# Patient Record
Sex: FEMALE | Race: BLACK OR AFRICAN AMERICAN | NOT HISPANIC OR LATINO | Employment: FULL TIME | URBAN - METROPOLITAN AREA
[De-identification: names, ages, dates, MRNs, and addresses within clinical notes are randomized per-mention and may not be internally consistent; named-entity substitution may affect disease eponyms.]

---

## 2021-03-19 ENCOUNTER — OFFICE VISIT (OUTPATIENT)
Dept: FAMILY MEDICINE CLINIC | Facility: CLINIC | Age: 46
End: 2021-03-19

## 2021-03-19 VITALS
BODY MASS INDEX: 26.06 KG/M2 | HEIGHT: 65 IN | WEIGHT: 156.4 LBS | SYSTOLIC BLOOD PRESSURE: 130 MMHG | DIASTOLIC BLOOD PRESSURE: 72 MMHG | OXYGEN SATURATION: 98 % | HEART RATE: 75 BPM

## 2021-03-19 DIAGNOSIS — G43.111 INTRACTABLE MIGRAINE WITH AURA WITH STATUS MIGRAINOSUS: Primary | ICD-10-CM

## 2021-03-19 DIAGNOSIS — J30.2 SEASONAL ALLERGIC RHINITIS, UNSPECIFIED TRIGGER: ICD-10-CM

## 2021-03-19 PROCEDURE — 96372 THER/PROPH/DIAG INJ SC/IM: CPT | Performed by: FAMILY MEDICINE

## 2021-03-19 PROCEDURE — 99204 OFFICE O/P NEW MOD 45 MIN: CPT | Performed by: FAMILY MEDICINE

## 2021-03-19 RX ORDER — GALCANEZUMAB 120 MG/ML
120 INJECTION, SOLUTION SUBCUTANEOUS
Qty: 1 PEN | Refills: 5 | Status: SHIPPED | OUTPATIENT
Start: 2021-03-19 | End: 2021-05-17 | Stop reason: SDUPTHER

## 2021-03-19 RX ORDER — KETOROLAC TROMETHAMINE 30 MG/ML
60 INJECTION, SOLUTION INTRAMUSCULAR; INTRAVENOUS ONCE
Status: COMPLETED | OUTPATIENT
Start: 2021-03-19 | End: 2021-03-19

## 2021-03-19 RX ORDER — ATORVASTATIN CALCIUM 20 MG/1
20 TABLET, FILM COATED ORAL DAILY
COMMUNITY
Start: 2021-03-02

## 2021-03-19 RX ORDER — GALCANEZUMAB 120 MG/ML
240 INJECTION, SOLUTION SUBCUTANEOUS ONCE
Qty: 2 PEN | Refills: 0 | COMMUNITY
Start: 2021-03-19 | End: 2021-03-19

## 2021-03-19 RX ORDER — PROMETHAZINE HYDROCHLORIDE 25 MG/1
25 TABLET ORAL EVERY 8 HOURS PRN
Qty: 30 TABLET | Refills: 3 | Status: SHIPPED | OUTPATIENT
Start: 2021-03-19

## 2021-03-19 RX ORDER — RIMEGEPANT SULFATE 75 MG/75MG
75 TABLET, ORALLY DISINTEGRATING ORAL ONCE AS NEEDED
Qty: 8 TABLET | Refills: 3 | Status: SHIPPED | OUTPATIENT
Start: 2021-03-19 | End: 2021-05-17

## 2021-03-19 RX ADMIN — KETOROLAC TROMETHAMINE 60 MG: 30 INJECTION, SOLUTION INTRAMUSCULAR; INTRAVENOUS at 16:07

## 2021-03-19 NOTE — PROGRESS NOTES
"Chief Complaint  Establish Care and Headache (Has had headache for two weeks constant, level of intensity differs)    Subjective          Dahlia Gustafson presents to [facility]  History of Present Illness     She used to take Emgality for migraines and it worked well. She used to have headaches for 2-3 weeks. Aimovig was not helpful. Previous medications included amitriptyline, Ubrelvy, excedrin migraine, Tylenol, ibuprofen. Diagnosed in her early 20s. Migraine with double vision, nausea, photosensitivity, and severe side and top headache. Visual changes and then headache onset. She has had been to ED multiple times for severe migraines requiring treatment.           Objective   Vital Signs:   /72   Pulse 75   Ht 165.1 cm (65\")   Wt 70.9 kg (156 lb 6.4 oz)   SpO2 98%   BMI 26.03 kg/m²     Physical Exam  Vitals reviewed.   Constitutional:       General: She is not in acute distress.     Appearance: She is not ill-appearing.   HENT:      Head:      Comments: Right temporal scalp tenderness  Cardiovascular:      Rate and Rhythm: Normal rate and regular rhythm.   Pulmonary:      Effort: Pulmonary effort is normal.      Breath sounds: Normal breath sounds.   Neurological:      Mental Status: She is alert.   Psychiatric:         Mood and Affect: Mood normal.         Behavior: Behavior normal.         Thought Content: Thought content normal.         Judgment: Judgment normal.        Result Review :                    Administrations This Visit     ketorolac (TORADOL) injection 60 mg     Admin Date  03/19/2021 Action  Given Dose  60 mg Route  Intramuscular Administered By  Ewa Rodriguez MA                Assessment and Plan    Diagnoses and all orders for this visit:    1. Intractable migraine with aura with status migrainosus (Primary)  -     galcanezumab-gnlm (Emgality) 120 MG/ML prefilled syringe; Inject 1 mL under the skin into the appropriate area as directed Every 30 (Thirty) " Days.  Dispense: 1 pen; Refill: 5  -     galcanezumab-gnlm (Emgality) 120 MG/ML prefilled syringe; Inject 2 mL under the skin into the appropriate area as directed 1 (One) Time for 1 dose.  Dispense: 2 pen; Refill: 0  -     promethazine (PHENERGAN) 25 MG tablet; Take 1 tablet by mouth Every 8 (Eight) Hours As Needed for Nausea or Vomiting (migraine).  Dispense: 30 tablet; Refill: 3  -     ketorolac (TORADOL) injection 60 mg  -     Rimegepant Sulfate (Nurtec) 75 MG tablet dispersible tablet; Take 1 tablet by mouth 1 (One) Time As Needed (migraine) for up to 1 dose.  Dispense: 8 tablet; Refill: 3    2. Seasonal allergic rhinitis, unspecified trigger       Follow Up   Return in about 2 months (around 5/19/2021) for Office visit migraines.  Patient was given instructions and counseling regarding her condition or for health maintenance advice. Please see specific information pulled into the AVS if appropriate.     Sample Emgality loading dose provided to patient today.  She is familiar with the injection as she previously was on the medication.  Sent prescription for continuing monthly dose to the pharmacy.  Will need prior authorization.  Prior medications included amitriptyline, Ubrelvy, ibuprofen, Excedrin, Tylenol, and Emgality.  She has migraine features with aura and is currently intractable.  Toradol injection given today in the office.  We will also start Nurtec as needed for abortive therapy.  Phenergan as needed when nausea occurs.  Reassess in the next 1 to 2 months once on treatment.    New onset sinus symptoms likely related to seasonal allergies.  Recommend use of over-the-counter antihistamine such as Claritin, Zyrtec, or Allegra.  No signs of sinus infection needing antibiotics at this time.    Electronically signed by Kasey Mejia MD, 03/19/21, 5:04 PM EDT.

## 2021-03-23 ENCOUNTER — PRIOR AUTHORIZATION (OUTPATIENT)
Dept: FAMILY MEDICINE CLINIC | Facility: CLINIC | Age: 46
End: 2021-03-23

## 2021-03-23 DIAGNOSIS — G43.111 INTRACTABLE MIGRAINE WITH AURA WITH STATUS MIGRAINOSUS: Primary | ICD-10-CM

## 2021-03-23 NOTE — TELEPHONE ENCOUNTER
Please call patient and let her know the Emgality prescription was denied.  Take the sample as was provided in the office.  I am placing a referral to neurology to see if they can help with her migraines.

## 2021-03-23 NOTE — TELEPHONE ENCOUNTER
Called pt to let her know we have a sample of Emgality for her on the second floor in the sample refrigerator with her name on it.      LVM for pt to call office.    Hub may relay message.

## 2021-03-23 NOTE — TELEPHONE ENCOUNTER
(Key: [id number]) - [id number]  Nurtec 75MG dispersible tablets  Status: PA Request  Created: March 23rd, 2021 [id number]  Sent: March 23rd, 2021    Almotriptan (Axert)  Eletriptan (Relpax)  Frovatriptan (Frova)  Naratriptan (Amerge)  Rizatriptan (Maxalt; Maxalt-MLT)  Sumatriptan tablets (Imitrex)  Sumatriptan nasal (Imitrex)  Sumatriptan-Naproxen (Treximet)  Zolmitriptan tablets (Zomig; Zomig ZMT)  Zolmitriptan nasal (Zomig)  Other (please specify)    (1) The drug will not be used for preventive treatment of your condition.  (2) If you have 4 or more headache days per month, you must meet one of the following  (a) You are currently being treated with Elavil (amitriptyline) or Effexor (venlafaxine) unless you  cannot take these drugs.  (b) You are currently being treated with Depakote/Depakote ER (divalproex sodium) or Topamax  (topiramate) unless you cannot take these drugs.  (c) You are currently being treated with a beta blocker (that is, atenolol, propranolol, nadolol,  timolol, or metoprolol) unless you cannot take these drugs.  (3) The drug is prescribed or recommended by a doctor who specializes in brain and nerve disorders  (neurologist), a pain specialist, or a headache specialist.  The information provided does not show that you meet the criteria listed above.  Please speak with your doctor about your choices.    Key: [id number] - [id number]  Emgality 120MG/ML auto-injectors (migraine)  Status: DENIED  Created: March 23rd, 2021 [id number]  Sent: March 23rd, 2021    Per your health plan's criteria this drug is covered if you meet the following:  (1) The drug is prescribed or recommended by a doctor who specializes in brain and nerve disorders  (neurologist) a pain specialist or a headache specialist.  (2) You are not taking this drug with another injectable calcitonin gene-related peptide inhibitor.  The information provided does not show that you meet the criteria listed above.  Please speak with your  doctor about your choices

## 2021-05-17 ENCOUNTER — OFFICE VISIT (OUTPATIENT)
Dept: NEUROLOGY | Facility: CLINIC | Age: 46
End: 2021-05-17

## 2021-05-17 VITALS
HEIGHT: 65 IN | SYSTOLIC BLOOD PRESSURE: 138 MMHG | DIASTOLIC BLOOD PRESSURE: 80 MMHG | OXYGEN SATURATION: 99 % | BODY MASS INDEX: 27.49 KG/M2 | HEART RATE: 89 BPM | WEIGHT: 165 LBS | TEMPERATURE: 96.8 F

## 2021-05-17 DIAGNOSIS — G43.719 INTRACTABLE CHRONIC MIGRAINE WITHOUT AURA AND WITHOUT STATUS MIGRAINOSUS: ICD-10-CM

## 2021-05-17 DIAGNOSIS — G43.111 INTRACTABLE MIGRAINE WITH AURA WITH STATUS MIGRAINOSUS: ICD-10-CM

## 2021-05-17 DIAGNOSIS — H53.9 VISION CHANGES: Primary | ICD-10-CM

## 2021-05-17 PROCEDURE — 99203 OFFICE O/P NEW LOW 30 MIN: CPT | Performed by: NURSE PRACTITIONER

## 2021-05-17 RX ORDER — SUMATRIPTAN 50 MG/1
50 TABLET, FILM COATED ORAL ONCE AS NEEDED
Qty: 9 TABLET | Refills: 11 | Status: SHIPPED | OUTPATIENT
Start: 2021-05-17 | End: 2022-05-17

## 2021-05-17 RX ORDER — GALCANEZUMAB 120 MG/ML
120 INJECTION, SOLUTION SUBCUTANEOUS
Qty: 1 PEN | Refills: 11 | Status: SHIPPED | OUTPATIENT
Start: 2021-05-17

## 2021-05-17 NOTE — PROGRESS NOTES
Subjective:     Patient ID: Dahlia Gustafson is a 46 y.o. female.    CC:   Chief Complaint   Patient presents with   • Migraine       HPI:   History of Present Illness     Ms. Gustafson is a very pleasant 46-year-old female here today for initial neurological evaluation with complaints of migraines.  She tells me that she has had migraines for many years.  She describes her migraines as throbbing pain which is holoacranial.  She typically will have a vision change prior to having a headache, typically this is described as double vision.  She also has associated nausea, photo and phonophobia as well as dizziness.  A few years [place] doctor [place] gave her amitriptyline for headache prevention.  She was unable to tolerate this medication.  They then subsequently started her on Emgality a few years ago, she states that this worked very well however she had a change in insurance and this was no longer covered.  She went a couple years without any medications at all for headaches.  She recently establish care with a primary care within [facility], at that time her PCP attempted to get Emgality covered for her, she did give her a sample.  Unfortunately again her insurance declined coverage, her PCP has given her a couple samples which is kept her on the medications.  Prior to having the amitriptyline she was having several migraines a week, since she started the Emgality she is having maybe 1 or 2 every other week.  Her migraines typically will last 24 hours.  She has been prescribed Ubrelvy and Nurtec, apparently her insurance would not cover either.  She has never used sumatriptan or any other triptan that she is aware of.  She has not had an eye exam in a couple years, she is asking if I can send her for an appointment.  She does admit that she uses NSAIDs several times a week, she also drinks a significant amount of coffee and not much water.  She sleeps okay, she snores at times.  Other medications tried and failed  in the past include Zoloft  She denies any confusional episodes, stroke symptoms, seizures, paresthesias, syncope, weakness or falls.  The following portions of the patient's history were reviewed and updated as appropriate: allergies, current medications, past family history, past medical history, past social history, past surgical history and problem list.    Past Medical History:   Diagnosis Date   • Endometriosis    • Hyperlipemia    • Migraine headache     Prior meds amitriptyline, Fioricet, phenergan, Emgality       Past Surgical History:   Procedure Laterality Date   • LAPAROSCOPIC ASSISTED VAGINAL HYSTERECTOMY SALPINGO OOPHORECTOMY  2004    endometriosis   • LAPAROSCOPY DIAGNOSTIC / BIOPSY / ASPIRATION / LYSIS  x5    endometriosis        Social History     Socioeconomic History   • Marital status:      Spouse name: Not on file   • Number of children: Not on file   • Years of education: Not on file   • Highest education level: Not on file   Tobacco Use   • Smoking status: Current Every Day Smoker     Packs/day: 0.50     Years: 14.00     Pack years: 7.00     Types: Cigarettes     Start date: 1992   • Smokeless tobacco: Never Used   • Tobacco comment: Thinking about quiting   Vaping Use   • Vaping Use: Never used   Substance and Sexual Activity   • Alcohol use: Yes     Comment: Social   • Drug use: Never   • Sexual activity: Yes       Family History   Adopted: Yes        Review of Systems   Constitutional: Negative.    HENT: Negative for hearing loss, tinnitus and trouble swallowing.    Eyes: Positive for visual disturbance.   Respiratory: Negative.    Cardiovascular: Negative.    Gastrointestinal: Negative.    Endocrine: Negative.    Genitourinary: Negative.    Musculoskeletal: Negative.    Skin: Negative.    Allergic/Immunologic: Negative.    Neurological: Positive for dizziness ( Only with migraines) and headaches. Negative for tremors, seizures, syncope, facial asymmetry, speech difficulty, weakness,  "light-headedness and numbness.   Hematological: Negative.    Psychiatric/Behavioral: Negative.         Objective:  /80   Pulse 89   Temp 96.8 °F (36 °C)   Ht 165.1 cm (65\")   Wt 74.8 kg (165 lb)   SpO2 99%   BMI 27.46 kg/m²     Neurologic Exam     Mental Status   Oriented to person, place, and time.   Attention: normal. Concentration: normal.   Speech: speech is normal   Level of consciousness: alert  Knowledge: consistent with education.   Able to read. Able to write. Normal comprehension.     Cranial Nerves   Cranial nerves II through XII intact.     CN II   Visual fields full to confrontation.   Right visual field deficit: none  Left visual field deficit: none     CN III, IV, VI   Pupils are equal, round, and reactive to light.  Extraocular motions are normal.   Right pupil: Size: 3 mm. Shape: regular. Reactivity: brisk. Consensual response: intact. Accommodation: intact.   Left pupil: Size: 3 mm. Shape: regular. Reactivity: brisk. Consensual response: intact. Accommodation: intact.   CN III: no CN III palsy  CN VI: no CN VI palsy  Nystagmus: none   Upgaze: normal  Downgaze: normal    CN V   Facial sensation intact.     CN VII   Facial expression full, symmetric.     CN VIII   CN VIII normal.     CN IX, X   CN IX normal.   CN X normal.     CN XI   CN XI normal.     CN XII   CN XII normal.     Motor Exam   Muscle bulk: normal  Overall muscle tone: normal  Right arm tone: normal  Left arm tone: normal  Right arm pronator drift: absent  Left arm pronator drift: absent  Right leg tone: normal  Left leg tone: normal    Strength   Strength 5/5 throughout.     Sensory Exam   Light touch normal.     Gait, Coordination, and Reflexes     Gait  Gait: normal    Coordination   Romberg: negative  Finger to nose coordination: normal  Heel to shin coordination: normal  Tandem walking coordination: normal    Tremor   Resting tremor: absent  Intention tremor: absent  Action tremor: absent    Reflexes   Reflexes 2+ " except as noted.   Right plantar: normal  Left plantar: normal  Right Cervantes: absent  Left Cervantes: absent      Physical Exam  Vitals reviewed.   Constitutional:       General: She is not in acute distress.     Appearance: Normal appearance. She is well-developed. She is not ill-appearing or toxic-appearing.   HENT:      Head: Normocephalic and atraumatic.      Mouth/Throat:      Mouth: Mucous membranes are moist.   Eyes:      General: No scleral icterus.     Extraocular Movements: Extraocular movements intact and EOM normal.      Conjunctiva/sclera: Conjunctivae normal.      Pupils: Pupils are equal, round, and reactive to light.   Pulmonary:      Effort: Pulmonary effort is normal. No respiratory distress.   Musculoskeletal:      Cervical back: Normal range of motion and neck supple.   Skin:     General: Skin is warm.      Capillary Refill: Capillary refill takes less than 2 seconds.   Neurological:      General: No focal deficit present.      Mental Status: She is alert and oriented to person, place, and time.      Coordination: Finger-Nose-Finger Test, Heel to Shin Test and Romberg Test normal.      Gait: Gait is intact. Tandem walk normal.      Deep Tendon Reflexes: Strength normal.   Psychiatric:         Mood and Affect: Mood normal.         Speech: Speech normal.         Behavior: Behavior normal.         Thought Content: Thought content normal.         Judgment: Judgment normal.         Assessment/Plan:       Diagnoses and all orders for this visit:    1. Vision changes (Primary)  -     Ambulatory Referral to Ophthalmology  -     MRI Brain With & Without Contrast    2. Intractable chronic migraine without aura and without status migrainosus  -     Ambulatory Referral to Ophthalmology  -     MRI Brain With & Without Contrast    3. Intractable migraine with aura with status migrainosus  -     galcanezumab-gnlm (Emgality) 120 MG/ML auto-injector pen; Inject 1 mL under the skin into the appropriate area as  directed Every 30 (Thirty) Days.  Dispense: 1 pen; Refill: 11    Other orders  -     SUMAtriptan (Imitrex) 50 MG tablet; Take 1 tablet by mouth 1 (One) Time As Needed for Migraine. Take one tablet at onset of headache. May repeat dose one time in 2 hours if headache not relieved.  Dispense: 9 tablet; Refill: 11    This patient is here today for chronic migraines.  She has tried Zoloft and amitriptyline in the past with no improvement.  She has been on Emgality injections for about 3 months now which have helped tremendously, I have sent in a prescription and given her a co-pay card.  She is instructed to activate co-pay card.  She is never tried triptans, she has no history of cardiovascular disease or stroke, prescription for Imitrex sent in, she is instructed on proper use.  I do suggest that she cut back on NSAID use as well as caffeine.  Recommend that she drink plenty of water daily.  I have given her a handout on supplements including magnesium, co-Q10 and riboflavin which can help prevent headaches as well.  MRI of the brain ordered with and without contrast in consideration of tumor or lesion.   Referral to ophthalmology for complaints of intermittent double vision and blurred vision  I would like to see her back in about 6 weeks or sooner if needed, should she have any questions concerns or problems prior to follow-up appointment she is encouraged to notify my office ASAP  PCP notes reviewed prior to appointment           Reviewed medications, potential side effects and signs and symptoms to report. Discussed risk versus benefits of treatment plan with patient and/or family-including medications, labs and radiology that may be ordered. Addressed questions and concerns during visit. Patient and/or family verbalized understanding and agree with plan.    AS THE PROVIDER, I PERSONALLY WORE PPE DURING ENTIRE FACE TO FACE ENCOUNTER IN CLINIC WITH THE PATIENT. PATIENT ALSO WORE PPE DURING ENTIRE FACE TO FACE  ENCOUNTER EXCEPT FOR A MAX OF 30 SECONDS DURING NEUROLOGICAL EVALUATION OF CRANIAL NERVES AND THEN MASK WAS PLACED BACK OVER PATIENT FACE FOR REMAINDER OF VISIT. I WASHED MY HANDS BEFORE AND AFTER VISIT.        Mariza Shannon, APRN  5/17/2021

## 2021-05-18 ENCOUNTER — OFFICE VISIT (OUTPATIENT)
Dept: FAMILY MEDICINE CLINIC | Facility: CLINIC | Age: 46
End: 2021-05-18

## 2021-05-18 VITALS
OXYGEN SATURATION: 98 % | BODY MASS INDEX: 27.22 KG/M2 | DIASTOLIC BLOOD PRESSURE: 80 MMHG | WEIGHT: 163.4 LBS | SYSTOLIC BLOOD PRESSURE: 122 MMHG | HEIGHT: 65 IN | HEART RATE: 77 BPM

## 2021-05-18 DIAGNOSIS — R35.0 URINE FREQUENCY: Primary | ICD-10-CM

## 2021-05-18 LAB
BILIRUB BLD-MCNC: NEGATIVE MG/DL
CLARITY, POC: CLEAR
COLOR UR: YELLOW
GLUCOSE UR STRIP-MCNC: NEGATIVE MG/DL
KETONES UR QL: NEGATIVE
LEUKOCYTE EST, POC: NEGATIVE
NITRITE UR-MCNC: NEGATIVE MG/ML
PH UR: 6 [PH] (ref 5–8)
PROT UR STRIP-MCNC: NEGATIVE MG/DL
RBC # UR STRIP: NEGATIVE /UL
SP GR UR: 1.01 (ref 1–1.03)
UROBILINOGEN UR QL: NORMAL

## 2021-05-18 PROCEDURE — 99213 OFFICE O/P EST LOW 20 MIN: CPT | Performed by: FAMILY MEDICINE

## 2021-05-18 PROCEDURE — 81003 URINALYSIS AUTO W/O SCOPE: CPT | Performed by: FAMILY MEDICINE

## 2021-05-18 RX ORDER — SULFAMETHOXAZOLE AND TRIMETHOPRIM 800; 160 MG/1; MG/1
1 TABLET ORAL 2 TIMES DAILY
Qty: 6 TABLET | Refills: 0 | Status: SHIPPED | OUTPATIENT
Start: 2021-05-18 | End: 2021-05-21

## 2021-05-18 NOTE — PROGRESS NOTES
"Chief Complaint  Urinary Tract Infection    Subjective          Dahlia Gustafson presents to [facility]  Urinary Tract Infection   This is a new problem. There has been no fever. Associated symptoms include flank pain, frequency and urgency. Pertinent negatives include no chills or hematuria. Associated symptoms comments: No urinary odor. There is no history of kidney stones or recurrent UTIs.     UTI symptoms for a while with left lower back pain. She tried increased water. 4 days ago right back pain where she had pain with other UTIs. She took OTC Azo for 2 days and she seems a little better. Sexually active with female partner recently but didn't urinate like she usually does.     Objective   Vital Signs:   /80 (BP Location: Left arm, Patient Position: Sitting, Cuff Size: Adult)   Pulse 77   Ht 165.1 cm (65\")   Wt 74.1 kg (163 lb 6.4 oz)   SpO2 98%   BMI 27.19 kg/m²     Physical Exam  Constitutional:       General: She is not in acute distress.     Appearance: She is not ill-appearing.   Pulmonary:      Effort: Pulmonary effort is normal.   Abdominal:      Tenderness: There is abdominal tenderness in the left lower quadrant. There is no guarding or rebound.   Genitourinary:     Comments: No suprapubic or CVA tenderness  Musculoskeletal:      Comments: No lower lumbar tenderness.  No muscle spasms.   Neurological:      Mental Status: She is alert.   Psychiatric:         Mood and Affect: Mood normal.         Behavior: Behavior normal.         Thought Content: Thought content normal.         Judgment: Judgment normal.        Result Review :   The following data was reviewed by: Kasey Mejia MD on 05/18/2021:                   Results for orders placed or performed in visit on 05/18/21   POC Urinalysis Dipstick, Automated    Specimen: Urine   Result Value Ref Range    Color Yellow Yellow, Straw, Dark Yellow, Susan    Clarity, UA Clear Clear    Specific [place]  " 1.015 1.005 - 1.030    pH, Urine 6.0 5.0 - 8.0    Leukocytes Negative Negative    Nitrite, UA Negative Negative    Protein, POC Negative Negative mg/dL    Glucose, UA Negative Negative, 1000 mg/dL (3+) mg/dL    Ketones, UA Negative Negative    Urobilinogen, UA Normal Normal    Bilirubin Negative Negative    Blood, UA Negative Negative          Assessment and Plan    Diagnoses and all orders for this visit:    1. Urine frequency (Primary)  -     POC Urinalysis Dipstick, Automated  -     Chlamydia trachomatis, Neisseria gonorrhoeae, Trichomonas vaginalis, PCR - Urine, Urine, Clean Catch; Future  -     Urine Culture - Urine, Urine, Clean Catch; Future  -     sulfamethoxazole-trimethoprim (BACTRIM DS,SEPTRA DS) 800-160 MG per tablet; Take 1 tablet by mouth 2 (Two) Times a Day for 3 days.  Dispense: 6 tablet; Refill: 0    New.  Treat empirically with Bactrim.  Check urine culture as well as STI testing.  May continue to use Azo for symptomatic relief and increase hydration.      Follow Up   Return if symptoms worsen or fail to improve.  Patient was given instructions and counseling regarding her condition or for health maintenance advice. Please see specific information pulled into the AVS if appropriate.

## 2021-05-20 DIAGNOSIS — R35.0 URINE FREQUENCY: ICD-10-CM

## 2021-05-24 ENCOUNTER — PRIOR AUTHORIZATION (OUTPATIENT)
Dept: NEUROLOGY | Facility: CLINIC | Age: 46
End: 2021-05-24

## 2021-05-25 ENCOUNTER — TELEPHONE (OUTPATIENT)
Dept: NEUROLOGY | Facility: CLINIC | Age: 46
End: 2021-05-25

## 2021-06-01 DIAGNOSIS — F40.240 CLAUSTROPHOBIA: Primary | ICD-10-CM

## 2021-06-01 RX ORDER — DIAZEPAM 2 MG/1
TABLET ORAL
Qty: 2 TABLET | Refills: 0 | Status: SHIPPED | OUTPATIENT
Start: 2021-06-01

## 2021-06-17 ENCOUNTER — HOSPITAL ENCOUNTER (OUTPATIENT)
Dept: MRI IMAGING | Facility: HOSPITAL | Age: 46
Discharge: HOME | End: 2021-06-17
Admitting: NURSE PRACTITIONER

## 2021-06-17 PROCEDURE — 0 GADOBENATE DIMEGLUMINE 529 MG/ML SOLUTION: Performed by: NURSE PRACTITIONER

## 2021-06-17 PROCEDURE — A9577 INJ MULTIHANCE: HCPCS | Performed by: NURSE PRACTITIONER

## 2021-06-17 PROCEDURE — 70553 MRI BRAIN STEM W/O & W/DYE: CPT

## 2021-06-17 RX ADMIN — GADOBENATE DIMEGLUMINE 15 ML: 529 INJECTION, SOLUTION INTRAVENOUS at 12:23

## 2021-06-22 ENCOUNTER — TELEPHONE (OUTPATIENT)
Dept: NEUROLOGY | Facility: CLINIC | Age: 46
End: 2021-06-22

## 2021-06-22 NOTE — TELEPHONE ENCOUNTER
----- Message from RENETTA Irving sent at 6/21/2021  4:02 PM EDT -----  Please let patient know MRI of the brain read as no acute abnormality and no abnormal enhancement per radiology

## 2022-06-01 DIAGNOSIS — G43.111 INTRACTABLE MIGRAINE WITH AURA WITH STATUS MIGRAINOSUS: ICD-10-CM

## 2022-06-02 RX ORDER — GALCANEZUMAB 120 MG/ML
INJECTION, SOLUTION SUBCUTANEOUS
Qty: 1 ML | Refills: 0 | OUTPATIENT
Start: 2022-06-02

## 2022-06-02 NOTE — TELEPHONE ENCOUNTER
Rx Refill Note  Requested Prescriptions     Pending Prescriptions Disp Refills   • Emgality 120 MG/ML auto-injector pen [Pharmacy Med Name: Emgality 120 MG/ML Subcutaneous Solution Auto-injector] 1 mL 0     Sig: INJECT 1 ML SUBCUTANEOUSLY ONCE EVERY MONTH AS DIRECTED      Last office visit with prescribing clinician: 5/17/2021      Next office visit with prescribing clinician: Visit date not found            Zulma Yeboah MA  06/02/22, 09:12 EDT

## 2022-06-07 DIAGNOSIS — G43.111 INTRACTABLE MIGRAINE WITH AURA WITH STATUS MIGRAINOSUS: ICD-10-CM

## 2022-06-08 RX ORDER — GALCANEZUMAB 120 MG/ML
INJECTION, SOLUTION SUBCUTANEOUS
Qty: 1 ML | Refills: 0 | OUTPATIENT
Start: 2022-06-08